# Patient Record
Sex: MALE | HISPANIC OR LATINO | Employment: PART TIME | URBAN - METROPOLITAN AREA
[De-identification: names, ages, dates, MRNs, and addresses within clinical notes are randomized per-mention and may not be internally consistent; named-entity substitution may affect disease eponyms.]

---
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
## 2023-08-30 ENCOUNTER — HOSPITAL ENCOUNTER (EMERGENCY)
Facility: CLINIC | Age: 18
Discharge: HOME | End: 2023-08-30
Attending: PHYSICIAN ASSISTANT | Admitting: PHYSICIAN ASSISTANT
Payer: COMMERCIAL

## 2023-08-30 VITALS
SYSTOLIC BLOOD PRESSURE: 130 MMHG | BODY MASS INDEX: 34.55 KG/M2 | WEIGHT: 215 LBS | HEIGHT: 66 IN | HEART RATE: 91 BPM | DIASTOLIC BLOOD PRESSURE: 81 MMHG | OXYGEN SATURATION: 98 % | TEMPERATURE: 98.4 F | RESPIRATION RATE: 18 BRPM

## 2023-08-30 DIAGNOSIS — R09.A2 SENSATION OF FOREIGN BODY IN THROAT: ICD-10-CM

## 2023-08-30 PROCEDURE — 250N000013 HC RX MED GY IP 250 OP 250 PS 637: Performed by: PHYSICIAN ASSISTANT

## 2023-08-30 PROCEDURE — 99283 EMERGENCY DEPT VISIT LOW MDM: CPT

## 2023-08-30 PROCEDURE — 250N000009 HC RX 250: Performed by: PHYSICIAN ASSISTANT

## 2023-08-30 PROCEDURE — 271N000002 HC RX 271: Performed by: PHYSICIAN ASSISTANT

## 2023-08-30 RX ORDER — LIDOCAINE HYDROCHLORIDE 20 MG/ML
10 SOLUTION OROPHARYNGEAL ONCE
Status: COMPLETED | OUTPATIENT
Start: 2023-08-30 | End: 2023-08-30

## 2023-08-30 RX ORDER — MAGNESIUM HYDROXIDE/ALUMINUM HYDROXICE/SIMETHICONE 120; 1200; 1200 MG/30ML; MG/30ML; MG/30ML
15 SUSPENSION ORAL ONCE
Status: COMPLETED | OUTPATIENT
Start: 2023-08-30 | End: 2023-08-30

## 2023-08-30 RX ORDER — LIDOCAINE HYDROCHLORIDE 20 MG/ML
10 SOLUTION OROPHARYNGEAL EVERY 4 HOURS PRN
Qty: 100 ML | Refills: 0 | Status: SHIPPED | OUTPATIENT
Start: 2023-08-30

## 2023-08-30 RX ADMIN — LIDOCAINE HYDROCHLORIDE 10 ML: 1 POWDER at 22:23

## 2023-08-30 RX ADMIN — ALUMINUM HYDROXIDE, MAGNESIUM HYDROXIDE, AND SIMETHICONE 15 ML: 200; 200; 20 SUSPENSION ORAL at 22:23

## 2023-08-30 RX ADMIN — ANTACID/ANTIFLATULENT 4 G: 380; 550; 10; 10 GRANULE, EFFERVESCENT ORAL at 22:24

## 2023-08-31 NOTE — ED PROVIDER NOTES
"  History     Chief Complaint:  Swallowed Foreign Body       The history is provided by the patient.      Jean-Pierre Pelayo is a 18 year old male  who presents with a possible food impaction after eating seasoned beef for dinner. Patient reports eating, and then he felt impaction/fullness in his esophagus. He is able to eat and drink still now without difficulty but still feels the fb sensation. No fever, cough, shortness of breath or pain in throat or chest..     Independent Historian:   Mother also provides above hx note symptoms started a few min after swallowing the meat.    Review of External Notes:   none       Medications:    Zyrtec  Flonase  Tessalon    Past Medical History:    Allergic rhinitis  Tachycardia  Leukocytosis  Lymphadenopathy  Tonsillar hypertrophy  Right maxillary sinus opacification  Acne vulgaris   Chronic allergic conjunctivitis     Past Surgical History:    Appendectomy  Teeth extraction    Physical Exam   Patient Vitals for the past 24 hrs:   BP Temp Temp src Pulse Resp SpO2 Height Weight   08/30/23 2109 130/81 98.4  F (36.9  C) Temporal 91 18 98 % 1.676 m (5' 6\") 97.5 kg (215 lb)      Physical Exam  General: Awake, alert, non-toxic.  Head:  Scalp is NC/AT  Eyes:  Conjunctiva normal, PERRL  ENT:  The external nose and ears are normal.     Oropharynx clear, uvula midline. No bleeding, swelling, hematoma or signs of trauma.  Handling secretions no muffled voice.    Neck:  Normal range of motion without rigidity. No ttp or swelling.  CV:  Regular rate and rhythm    No pathologic murmur, rubs, or gallops.  Resp:  Breath sounds are clear bilaterally.  No wheezing or stridor.    Non-labored, no retractions or accessory muscle use  Abdomen: Abdomen is soft, no distension, no tenderness, no masses.  Skin:  Warm and dry, No rash or lesions noted.  Neuro:  Alert and oriented.  GCS 15 Moves all extremities normal.  No facial asymmetry. Gait normal.  Psych: Awake. Alert. Normal affect. Appropriate " interactions.      Emergency Department Course     Emergency Department Course & Assessments:       Interventions:  Medications   sod bicarbonate-citric acid-simethicone (EZ GAS) 2.21-1.53-0.04 g packet 4 g (4 g Oral $Given 23)   lidocaine (viscous) (XYLOCAINE) 2 % solution 10 mL (10 mLs Mouth/Throat $Given 23)   alum & mag hydroxide-simethicone (MAALOX) suspension 15 mL (15 mLs Oral $Given 23)      Assessments:   I obtained history and examined the patient as noted above.    Re-checked the pt.  Full relief of symptoms w/GI cocktail.  Social Determinants of Health affecting care:   None    Disposition:  The patient was discharged to home.     Impression & Plan      Medical Decision Makin-year-old male presents after swallowing a piece of meat with foreign body sensation in esophagus/fullness.  Handling secretions without difficulty swallowing foods and liquids without difficulty.  Did give EZ gas but no change with this seemed to tolerate without difficulty.  On the other hand after receiving the GI cocktail had full relief of symptoms and sensation suspect due to minor abrasion/scratch to esophagus.  Nothing to suggest ongoing food impaction.  No evidence of esophageal rupture/Boerhaave syndrome.  No evidence of airway foreign body or trauma to the upper airway.  Nothing to suggest deep space infection of the head or neck.  I will write him for a little bit of viscous lidocaine for the next few days stressed soft foods small bites.  Follow-up with GI if recurrent issues.  Return if development of pain fever, sob, inability to tolerate oral intake or other concerns.    Diagnosis:    ICD-10-CM    1. Sensation of foreign body in throat  R09.89     resolved           Discharge Medications:  Discharge Medication List as of 2023 10:43 PM        START taking these medications    Details   lidocaine, viscous, (XYLOCAINE) 2 % solution Swish and swallow 10 mLs in mouth every 4  hours as needed for moderate pain ; Max 8 doses/24 hour period., Disp-100 mL, R-0, E-Prescribe            Scribe Disclosure:  I, Chinedu Perez, am serving as a scribe at 10:15 PM on 8/30/2023 to document services personally performed by Jerome Canas PA-C, based on my observations and the provider's statements to me.   8/30/2023   Jerome Canas PA-C Etten, Clark Ellsworth, PA-C  08/31/23 1202

## 2023-08-31 NOTE — DISCHARGE INSTRUCTIONS
You should eat a soft diet for the next few days.  You should return if significant pain in the throat or chest shortness of breath fever vomiting blood inability to swallow liquids or solids or other concerns.

## 2024-12-23 ENCOUNTER — HOSPITAL ENCOUNTER (EMERGENCY)
Facility: CLINIC | Age: 19
Discharge: HOME | End: 2024-12-24
Payer: COMMERCIAL

## 2024-12-23 VITALS
BODY MASS INDEX: 28.93 KG/M2 | HEART RATE: 71 BPM | TEMPERATURE: 97.8 F | SYSTOLIC BLOOD PRESSURE: 139 MMHG | RESPIRATION RATE: 22 BRPM | HEIGHT: 66 IN | WEIGHT: 180 LBS | DIASTOLIC BLOOD PRESSURE: 84 MMHG | OXYGEN SATURATION: 100 %

## 2024-12-23 PROCEDURE — 87651 STREP A DNA AMP PROBE: CPT | Performed by: EMERGENCY MEDICINE

## 2024-12-23 PROCEDURE — 99281 EMR DPT VST MAYX REQ PHY/QHP: CPT

## 2024-12-23 PROCEDURE — 87637 SARSCOV2&INF A&B&RSV AMP PRB: CPT | Performed by: EMERGENCY MEDICINE

## 2024-12-23 ASSESSMENT — COLUMBIA-SUICIDE SEVERITY RATING SCALE - C-SSRS
2. HAVE YOU ACTUALLY HAD ANY THOUGHTS OF KILLING YOURSELF IN THE PAST MONTH?: NO
6. HAVE YOU EVER DONE ANYTHING, STARTED TO DO ANYTHING, OR PREPARED TO DO ANYTHING TO END YOUR LIFE?: NO
1. IN THE PAST MONTH, HAVE YOU WISHED YOU WERE DEAD OR WISHED YOU COULD GO TO SLEEP AND NOT WAKE UP?: NO

## 2024-12-24 LAB
FLUAV RNA SPEC QL NAA+PROBE: NEGATIVE
FLUBV RNA RESP QL NAA+PROBE: NEGATIVE
RSV RNA SPEC NAA+PROBE: NEGATIVE
S PYO DNA THROAT QL NAA+PROBE: NOT DETECTED
SARS-COV-2 RNA RESP QL NAA+PROBE: NEGATIVE

## 2024-12-24 ASSESSMENT — ACTIVITIES OF DAILY LIVING (ADL)
ADLS_ACUITY_SCORE: 48

## 2024-12-24 NOTE — ED TRIAGE NOTES
Patient arrives with sore throat, pain when swallowing for 3 days. History of issues with tonsils and frequently gets strep. Last tylenol dose 1900.     Triage Assessment (Adult)       Row Name 12/23/24 1952          Triage Assessment    Airway WDL WDL        Respiratory WDL    Respiratory WDL WDL        Skin Circulation/Temperature WDL    Skin Circulation/Temperature WDL WDL        Cardiac WDL    Cardiac WDL WDL        Peripheral/Neurovascular WDL    Peripheral Neurovascular WDL WDL        Cognitive/Neuro/Behavioral WDL    Cognitive/Neuro/Behavioral WDL WDL